# Patient Record
Sex: FEMALE | Race: WHITE | NOT HISPANIC OR LATINO | URBAN - METROPOLITAN AREA
[De-identification: names, ages, dates, MRNs, and addresses within clinical notes are randomized per-mention and may not be internally consistent; named-entity substitution may affect disease eponyms.]

---

## 2017-02-17 ENCOUNTER — COMMUNICATION - HEALTHEAST (OUTPATIENT)
Dept: FAMILY MEDICINE | Facility: CLINIC | Age: 52
End: 2017-02-17

## 2017-12-19 ENCOUNTER — COMMUNICATION - HEALTHEAST (OUTPATIENT)
Dept: FAMILY MEDICINE | Facility: CLINIC | Age: 52
End: 2017-12-19

## 2018-03-19 ENCOUNTER — COMMUNICATION - HEALTHEAST (OUTPATIENT)
Dept: FAMILY MEDICINE | Facility: CLINIC | Age: 53
End: 2018-03-19

## 2018-03-20 ENCOUNTER — COMMUNICATION - HEALTHEAST (OUTPATIENT)
Dept: FAMILY MEDICINE | Facility: CLINIC | Age: 53
End: 2018-03-20

## 2018-03-22 ENCOUNTER — COMMUNICATION - HEALTHEAST (OUTPATIENT)
Dept: FAMILY MEDICINE | Facility: CLINIC | Age: 53
End: 2018-03-22

## 2018-04-17 ENCOUNTER — OFFICE VISIT - HEALTHEAST (OUTPATIENT)
Dept: FAMILY MEDICINE | Facility: CLINIC | Age: 53
End: 2018-04-17

## 2018-04-17 DIAGNOSIS — R01.1 UNDIAGNOSED CARDIAC MURMURS: ICD-10-CM

## 2018-04-17 DIAGNOSIS — N60.19 FIBROCYSTIC BREAST DISEASE (FCBD), UNSPECIFIED LATERALITY: ICD-10-CM

## 2018-04-17 DIAGNOSIS — Z00.00 ROUTINE GENERAL MEDICAL EXAMINATION AT A HEALTH CARE FACILITY: ICD-10-CM

## 2018-04-17 DIAGNOSIS — E55.9 VITAMIN D DEFICIENCY: ICD-10-CM

## 2018-04-17 DIAGNOSIS — F41.9 ANXIETY: ICD-10-CM

## 2018-04-17 DIAGNOSIS — E78.00 HYPERCHOLESTEREMIA: ICD-10-CM

## 2018-04-17 ASSESSMENT — MIFFLIN-ST. JEOR: SCORE: 1446.37

## 2018-05-01 ENCOUNTER — COMMUNICATION - HEALTHEAST (OUTPATIENT)
Dept: ADMINISTRATIVE | Facility: CLINIC | Age: 53
End: 2018-05-01

## 2018-07-18 ENCOUNTER — COMMUNICATION - HEALTHEAST (OUTPATIENT)
Dept: FAMILY MEDICINE | Facility: CLINIC | Age: 53
End: 2018-07-18

## 2018-07-23 ENCOUNTER — COMMUNICATION - HEALTHEAST (OUTPATIENT)
Dept: FAMILY MEDICINE | Facility: CLINIC | Age: 53
End: 2018-07-23

## 2018-08-03 RX ORDER — DULOXETIN HYDROCHLORIDE 20 MG/1
20 CAPSULE, DELAYED RELEASE ORAL DAILY
Qty: 30 CAPSULE | Refills: 6 | Status: SHIPPED | OUTPATIENT
Start: 2018-08-03

## 2019-04-26 ENCOUNTER — COMMUNICATION - HEALTHEAST (OUTPATIENT)
Dept: FAMILY MEDICINE | Facility: CLINIC | Age: 54
End: 2019-04-26

## 2019-04-26 DIAGNOSIS — N95.1 PERI-MENOPAUSAL: ICD-10-CM

## 2019-05-03 ENCOUNTER — COMMUNICATION - HEALTHEAST (OUTPATIENT)
Dept: FAMILY MEDICINE | Facility: CLINIC | Age: 54
End: 2019-05-03

## 2019-08-03 ENCOUNTER — COMMUNICATION - HEALTHEAST (OUTPATIENT)
Dept: FAMILY MEDICINE | Facility: CLINIC | Age: 54
End: 2019-08-03

## 2019-08-03 DIAGNOSIS — N95.1 PERI-MENOPAUSAL: ICD-10-CM

## 2019-11-11 ENCOUNTER — COMMUNICATION - HEALTHEAST (OUTPATIENT)
Dept: FAMILY MEDICINE | Facility: CLINIC | Age: 54
End: 2019-11-11

## 2019-11-11 DIAGNOSIS — N95.1 PERI-MENOPAUSAL: ICD-10-CM

## 2019-11-12 ENCOUNTER — COMMUNICATION - HEALTHEAST (OUTPATIENT)
Dept: FAMILY MEDICINE | Facility: CLINIC | Age: 54
End: 2019-11-12

## 2020-01-22 ENCOUNTER — RECORDS - HEALTHEAST (OUTPATIENT)
Dept: ADMINISTRATIVE | Facility: OTHER | Age: 55
End: 2020-01-22

## 2020-02-04 ENCOUNTER — RECORDS - HEALTHEAST (OUTPATIENT)
Dept: ADMINISTRATIVE | Facility: OTHER | Age: 55
End: 2020-02-04

## 2020-02-20 ENCOUNTER — COMMUNICATION - HEALTHEAST (OUTPATIENT)
Dept: FAMILY MEDICINE | Facility: CLINIC | Age: 55
End: 2020-02-20

## 2020-02-20 DIAGNOSIS — N95.1 PERI-MENOPAUSAL: ICD-10-CM

## 2020-02-25 ENCOUNTER — COMMUNICATION - HEALTHEAST (OUTPATIENT)
Dept: FAMILY MEDICINE | Facility: CLINIC | Age: 55
End: 2020-02-25

## 2020-09-25 ENCOUNTER — RECORDS - HEALTHEAST (OUTPATIENT)
Dept: ADMINISTRATIVE | Facility: OTHER | Age: 55
End: 2020-09-25

## 2021-05-28 ENCOUNTER — RECORDS - HEALTHEAST (OUTPATIENT)
Dept: ADMINISTRATIVE | Facility: CLINIC | Age: 56
End: 2021-05-28

## 2021-05-28 NOTE — TELEPHONE ENCOUNTER
Dr Vick    Fax received from Lorena Gaxiolas , insurance plan does not cover   EFFEXOR XR 37.5 mg 24 hr capsule 90 capsule 0 4/28/2019     Sig: TAKE 1 CAPSULE(37.5 MG) BY MOUTH DAILY    Sent to pharmacy as: EFFEXOR XR 37.5 mg 24 hr capsule          Covered Alternatives: Venlafaxine ER 37.5mg capsule    Will you consider changing to a covered alternative?    Is there a medical reason that the patient would need brand name only?    If you prefer that the patient stays on this medciation and you would like to start the PA process, please send this encounter to the [facility] pool       Insurance Plan: [facility]  Patient ID: [id number]

## 2021-05-28 NOTE — TELEPHONE ENCOUNTER
RN cannot approve Refill Request    RN can NOT refill this medication PCP messaged that patient is overdue for Office Visit. Last office visit: 11/1/2016 Aparna Vick MD Last Physical: 4/17/2018 Last MTM visit: Visit date not found Last visit same specialty: 11/1/2016 Aparna Vick MD.  Next visit within 3 mo: Visit date not found  Next physical within 3 mo: Visit date not found      Bryan Goyal, [facility] Triage/Med Refill 4/28/2019    Requested Prescriptions   Pending Prescriptions Disp Refills     EFFEXOR XR 37.5 mg 24 hr capsule [Pharmacy Med Name: EFFEXOR XR 37.5MG CAPSULES] 90 capsule 0     Sig: TAKE 1 CAPSULE(37.5 MG) BY MOUTH DAILY       Venlafaxine/Desvenlafaxine Refill Protocol Failed - 4/26/2019  9:41 PM        Failed - LFT or AST or ALT in last year     Albumin   Date Value Ref Range Status   07/08/2014 3.9 3.5 - 5.0 g/dL Final     Bilirubin, Total   Date Value Ref Range Status   07/08/2014 0.6 0.0 - 1.0 mg/dL Final     Alkaline Phosphatase   Date Value Ref Range Status   07/08/2014 50 45 - 120 U/L Final     AST   Date Value Ref Range Status   07/08/2014 15 0 - 40 U/L Final     ALT   Date Value Ref Range Status   07/08/2014 18 0 - 45 U/L Final     Protein, Total   Date Value Ref Range Status   07/08/2014 6.8 6.0 - 8.0 g/dL Final                Failed - Fasting lipid cascade in last year     Cholesterol   Date Value Ref Range Status   10/24/2016 238 (H) <=199 mg/dL Final     Triglycerides   Date Value Ref Range Status   10/24/2016 143 <=149 mg/dL Final     HDL Cholesterol   Date Value Ref Range Status   10/24/2016 46 (L) >=50 mg/dL Final     LDL Calculated   Date Value Ref Range Status   10/24/2016 163 (H) <=129 mg/dL Final     Patient Fasting > 8hrs?   Date Value Ref Range Status   10/24/2016 Yes  Final             Failed - PCP or prescribing provider visit in last year     Last office visit with prescriber/PCP: 11/1/2016 Aparna Vick MD OR same dept: Visit date not  found OR same specialty: 11/1/2016 Aparna Vick MD  Last physical: 4/17/2018 Last MTM visit: Visit date not found   Next visit within 3 mo: Visit date not found  Next physical within 3 mo: Visit date not found  Prescriber OR PCP: Aparna Vick MD  Last diagnosis associated with med order: There are no diagnoses linked to this encounter.  If protocol passes may refill for 12 months if within 3 months of last provider visit (or a total of 15 months).             Failed - Blood Pressure in last year     BP Readings from Last 1 Encounters:   04/17/18 124/84

## 2021-05-28 NOTE — TELEPHONE ENCOUNTER
Central PA team  [phone]  Pool: [facility] PA MED ([id number])          PA has been initiated.       PA form completed and faxed insurance via Cover My Meds     Key: [id number]    Medication: Effexor XR 37.5MG er capsules    Insurance: [facility] [employer]        Response will be received via fax and may take up to 5-10 business days depending on plan

## 2021-05-28 NOTE — TELEPHONE ENCOUNTER
She tried duloxetine in 2018 instead but it was insufficient to help her anxiety. She lost sleep and resumed the venlafaxine.  Is there another med she must try to get venlafaxine covered?

## 2021-05-29 ENCOUNTER — RECORDS - HEALTHEAST (OUTPATIENT)
Dept: ADMINISTRATIVE | Facility: CLINIC | Age: 56
End: 2021-05-29

## 2021-05-30 ENCOUNTER — RECORDS - HEALTHEAST (OUTPATIENT)
Dept: ADMINISTRATIVE | Facility: CLINIC | Age: 56
End: 2021-05-30

## 2021-05-31 NOTE — TELEPHONE ENCOUNTER
RN cannot approve Refill Request    RN can NOT refill this medication PCP messaged that patient is overdue for Labs and Office Visit, and BP check. Last office visit: 11/1/2016 Aparna Vick MD Last Physical: 4/17/2018 Last MTM visit: Visit date not found Last visit same specialty: 11/1/2016 Aparna Vick MD.  Next visit within 3 mo: Visit date not found  Next physical within 3 mo: Visit date not found      Matheus Salazar, [facility] Triage/Med Refill 8/3/2019    Requested Prescriptions   Pending Prescriptions Disp Refills     EFFEXOR XR 37.5 mg 24 hr capsule [Pharmacy Med Name: EFFEXOR XR 37.5MG CAPSULES] 90 capsule 0     Sig: TAKE 1 CAPSULE BY MOUTH DAILY       Venlafaxine/Desvenlafaxine Refill Protocol Failed - 8/3/2019  9:56 AM        Failed - LFT or AST or ALT in last year     Albumin   Date Value Ref Range Status   07/08/2014 3.9 3.5 - 5.0 g/dL Final     Bilirubin, Total   Date Value Ref Range Status   07/08/2014 0.6 0.0 - 1.0 mg/dL Final     Alkaline Phosphatase   Date Value Ref Range Status   07/08/2014 50 45 - 120 U/L Final     AST   Date Value Ref Range Status   07/08/2014 15 0 - 40 U/L Final     ALT   Date Value Ref Range Status   07/08/2014 18 0 - 45 U/L Final     Protein, Total   Date Value Ref Range Status   07/08/2014 6.8 6.0 - 8.0 g/dL Final                Failed - Fasting lipid cascade in last year     Cholesterol   Date Value Ref Range Status   10/24/2016 238 (H) <=199 mg/dL Final     Triglycerides   Date Value Ref Range Status   10/24/2016 143 <=149 mg/dL Final     HDL Cholesterol   Date Value Ref Range Status   10/24/2016 46 (L) >=50 mg/dL Final     LDL Calculated   Date Value Ref Range Status   10/24/2016 163 (H) <=129 mg/dL Final     Patient Fasting > 8hrs?   Date Value Ref Range Status   10/24/2016 Yes  Final             Failed - PCP or prescribing provider visit in last year     Last office visit with prescriber/PCP: 11/1/2016 Aparna Vick MD OR same dept: Visit  date not found OR same specialty: 11/1/2016 Aparna Vick MD  Last physical: 4/17/2018 Last MTM visit: Visit date not found   Next visit within 3 mo: Visit date not found  Next physical within 3 mo: Visit date not found  Prescriber OR PCP: Aparna Vick MD  Last diagnosis associated with med order: 1. Geni-menopausal  - EFFEXOR XR 37.5 mg 24 hr capsule [Pharmacy Med Name: EFFEXOR XR 37.5MG CAPSULES]; TAKE 1 CAPSULE BY MOUTH DAILY  Dispense: 90 capsule; Refill: 0    If protocol passes may refill for 12 months if within 3 months of last provider visit (or a total of 15 months).             Failed - Blood Pressure in last year     BP Readings from Last 1 Encounters:   04/17/18 124/84

## 2021-06-01 VITALS — BODY MASS INDEX: 28.33 KG/M2 | WEIGHT: 180.5 LBS | HEIGHT: 67 IN

## 2021-06-02 ENCOUNTER — RECORDS - HEALTHEAST (OUTPATIENT)
Dept: ADMINISTRATIVE | Facility: CLINIC | Age: 56
End: 2021-06-02

## 2021-06-03 NOTE — TELEPHONE ENCOUNTER
RN cannot approve Refill Request    RN can NOT refill this medication PCP messaged that patient is overdue for Labs and Office Visit. Last office visit: 11/1/2016 Aparna Vick MD Last Physical: 4/17/2018 Last MTM visit: Visit date not found Last visit same specialty: 11/1/2016 Aparna Vick MD.  Next visit within 3 mo: Visit date not found  Next physical within 3 mo: Visit date not found      Kate Patino, [facility] Triage/Med Refill 11/12/2019    Requested Prescriptions   Pending Prescriptions Disp Refills     EFFEXOR XR 37.5 mg 24 hr capsule [Pharmacy Med Name: EFFEXOR XR 37.5MG CAPSULES] 90 capsule 0     Sig: TAKE 1 CAPSULE BY MOUTH EVERY DAY       Venlafaxine/Desvenlafaxine Refill Protocol Failed - 11/11/2019  3:01 PM        Failed - LFT or AST or ALT in last year     Albumin   Date Value Ref Range Status   07/08/2014 3.9 3.5 - 5.0 g/dL Final     Bilirubin, Total   Date Value Ref Range Status   07/08/2014 0.6 0.0 - 1.0 mg/dL Final     Alkaline Phosphatase   Date Value Ref Range Status   07/08/2014 50 45 - 120 U/L Final     AST   Date Value Ref Range Status   07/08/2014 15 0 - 40 U/L Final     ALT   Date Value Ref Range Status   07/08/2014 18 0 - 45 U/L Final     Protein, Total   Date Value Ref Range Status   07/08/2014 6.8 6.0 - 8.0 g/dL Final                Failed - Fasting lipid cascade in last year     Cholesterol   Date Value Ref Range Status   10/24/2016 238 (H) <=199 mg/dL Final     Triglycerides   Date Value Ref Range Status   10/24/2016 143 <=149 mg/dL Final     HDL Cholesterol   Date Value Ref Range Status   10/24/2016 46 (L) >=50 mg/dL Final     LDL Calculated   Date Value Ref Range Status   10/24/2016 163 (H) <=129 mg/dL Final     Patient Fasting > 8hrs?   Date Value Ref Range Status   10/24/2016 Yes  Final             Failed - PCP or prescribing provider visit in last year     Last office visit with prescriber/PCP: 11/1/2016 Aparna Vick MD OR same dept: Visit date  not found OR same specialty: 11/1/2016 Aparna Vick MD  Last physical: 4/17/2018 Last MTM visit: Visit date not found   Next visit within 3 mo: Visit date not found  Next physical within 3 mo: Visit date not found  Prescriber OR PCP: Aparna Vick MD  Last diagnosis associated with med order: 1. Geni-menopausal  - EFFEXOR XR 37.5 mg 24 hr capsule [Pharmacy Med Name: EFFEXOR XR 37.5MG CAPSULES]; TAKE 1 CAPSULE BY MOUTH EVERY DAY  Dispense: 90 capsule; Refill: 0    If protocol passes may refill for 12 months if within 3 months of last provider visit (or a total of 15 months).             Failed - Blood Pressure in last year     BP Readings from Last 1 Encounters:   04/17/18 124/84

## 2021-06-06 NOTE — TELEPHONE ENCOUNTER
RN cannot approve Refill Request    RN can NOT refill this medication Protocol failed and NO refill given. Last office visit: 11/1/2016 Aparna Vick MD Last Physical: 4/17/2018 Last MTM visit: Visit date not found Last visit same specialty: 11/1/2016 Aparna Vick MD.  Next visit within 3 mo: Visit date not found  Next physical within 3 mo: Visit date not found      Kerline Sheppard, [facility] Triage/Med Refill 2/23/2020    Requested Prescriptions   Pending Prescriptions Disp Refills     EFFEXOR XR 37.5 mg 24 hr capsule [Pharmacy Med Name: EFFEXOR XR 37.5MG CAPSULES] 90 capsule 0     Sig: TAKE 1 CAPSULE BY MOUTH EVERY DAY       Venlafaxine/Desvenlafaxine Refill Protocol Failed - 2/20/2020  8:28 AM        Failed - LFT or AST or ALT in last year     Albumin   Date Value Ref Range Status   07/08/2014 3.9 3.5 - 5.0 g/dL Final     Bilirubin, Total   Date Value Ref Range Status   07/08/2014 0.6 0.0 - 1.0 mg/dL Final     Alkaline Phosphatase   Date Value Ref Range Status   07/08/2014 50 45 - 120 U/L Final     AST   Date Value Ref Range Status   07/08/2014 15 0 - 40 U/L Final     ALT   Date Value Ref Range Status   07/08/2014 18 0 - 45 U/L Final     Protein, Total   Date Value Ref Range Status   07/08/2014 6.8 6.0 - 8.0 g/dL Final                Failed - Fasting lipid cascade in last year     Cholesterol   Date Value Ref Range Status   10/24/2016 238 (H) <=199 mg/dL Final     Triglycerides   Date Value Ref Range Status   10/24/2016 143 <=149 mg/dL Final     HDL Cholesterol   Date Value Ref Range Status   10/24/2016 46 (L) >=50 mg/dL Final     LDL Calculated   Date Value Ref Range Status   10/24/2016 163 (H) <=129 mg/dL Final     Patient Fasting > 8hrs?   Date Value Ref Range Status   10/24/2016 Yes  Final             Failed - PCP or prescribing provider visit in last year     Last office visit with prescriber/PCP: 11/1/2016 Aparna Vick MD OR same dept: Visit date not found OR same specialty:  11/1/2016 Aparna Vick MD  Last physical: 4/17/2018 Last MTM visit: Visit date not found   Next visit within 3 mo: Visit date not found  Next physical within 3 mo: Visit date not found  Prescriber OR PCP: Aparna Vick MD  Last diagnosis associated with med order: 1. Geni-menopausal  - EFFEXOR XR 37.5 mg 24 hr capsule [Pharmacy Med Name: EFFEXOR XR 37.5MG CAPSULES]; TAKE 1 CAPSULE BY MOUTH EVERY DAY  Dispense: 90 capsule; Refill: 0    If protocol passes may refill for 12 months if within 3 months of last provider visit (or a total of 15 months).             Failed - Blood Pressure in last year     BP Readings from Last 1 Encounters:   04/17/18 124/84

## 2021-06-16 PROBLEM — K21.00 GASTROESOPHAGEAL REFLUX DISEASE WITH ESOPHAGITIS: Status: ACTIVE | Noted: 2020-10-08

## 2021-06-16 NOTE — TELEPHONE ENCOUNTER
Telephone Encounter by Alexandra Lowery at 5/8/2019  4:03 PM     Author: Alexandra Lowery Service: -- Author Type: --    Filed: 5/8/2019  4:04 PM Encounter Date: 5/3/2019 Status: Signed    : Alexandra Lowery APPROVED:    Approval start date:5/8/2019  Approval end date:5/8/2020    Pharmacy has been notified of approval and will contact patient when medication is ready for pickup.

## 2021-06-17 NOTE — PROGRESS NOTES
FEMALE PREVENTATIVE EXAM    Assessment and Plan:       1. Routine general medical examination at a health care facility  Doing well; she'll check into her coverage to get the Shingles shot; we'll provide an order or do a nurse-only visit for her to get the shot - however it's cheapest per her insurance. Given her strong familiy history of getting shingles, I strongly recommend she get this shot.    2. Anxiety  Continue Effexor    3. Fibrocystic breast disease (FCBD), unspecified laterality  Mammogram due; she'll do it at [facility] and will call from home to set up the appointment.    4. Hypercholesteremia  Recommend checking lipids this year or next    5. Murmurs  same    6. Vitamin D Deficiency (mild)  Continue supplement        Next follow up:  No Follow-up on file.    Immunization Review  Adult Imm Review: No immunizations due today  Documented tobacco use.  Website and phone contact for QuitPlan given to patient in AVS.    I discussed the following with the patient:   Adult Healthy Living: Importance of regular exercise  Healthy nutrition  Getting adequate sleep  Stress management    I have had an Advance Directives discussion with the patient. she has a document she is happy with.            Subjective:   Chief Complaint: Kate Ulrich is an 52 y.o. female here for a preventative health visit.     HPI:  W/o Effexor - cannot sleep due to anxiety - then goes back on it; each time she gets a supply, it requires 3 trips to the pharmacy because she's on the drug-price reduction plan. No new difficult events in the past year.    Healthy Habits  Are you taking a daily aspirin? No  Do you typically exercising at least 40 min, 3-4 times per week?  NO  Do you usually eat at least 4 servings of fruit and vegetables a day, include whole grains and fiber and avoid regularly eating high fat foods? NO  Have you had an eye exam in the past two years? Yes  Do you see a dentist twice per year? Yes  Do you have any concerns  "regarding sleep? No    Safety Screen  If you own firearms, are they secured in a locked gun cabinet or with trigger locks? Yes  Do you feel you are safe where you are living?: Yes (4/17/2018  8:22 AM)    Review of Systems:  Please see above.  The rest of the review of systems are negative for all systems.     Pap History:   Yes - updated in Problem List and Health Maintenance accordingly  Cancer Screening       Status Date      MAMMOGRAM Overdue 9/7/2017      Done 9/7/2016 MAMMO SCREENING BILATERAL     Patient has more history with this topic...    PAP SMEAR Next Due 7/31/2019      Done 7/31/2014 GYNECOLOGIC CYTOLOGY (PAP SMEAR)     Patient has more history with this topic...    COLONOSCOPY Next Due 11/1/2026      Declined 11/1/2016           Patient Care Team:  Aparna Miles MD as PCP - General (Family Medicine)        History     Reviewed By Date/Time Sections Reviewed    Aparna Vick MD 4/17/2018  9:13 AM Medical, Surgical, Tobacco, Alcohol, Drug Use, Sexual Activity, Family, Obstetric, Socioeconomic    Kristine Greenwood MA 4/17/2018  8:23 AM Tobacco            Objective:   Vital Signs:   Visit Vitals     /84 (Patient Site: Left Arm, Patient Position: Sitting, Cuff Size: Adult Regular)     Pulse 81     Temp 98  F (36.7  C) (Oral)     Ht 5' 7\" (1.702 m)     Wt 180 lb 8 oz (81.9 kg)     LMP 04/04/2018 (Approximate)     SpO2 98%  Comment: RA     Breastfeeding No     BMI 28.27 kg/m2          PHYSICAL EXAM  Physical Exam:  General Appearance: Alert, cooperative, no distress, appears stated age  Head: Normocephalic, without obvious abnormality, atraumatic  Eyes: PERRL, conjunctiva/corneas clear, EOM's intact  Ears: Normal TM's and external ear canals, both ears  Nose: Nares normal, septum midline,mucosa normal, no drainage  Throat: Lips, mucosa, and tongue normal; teeth and gums normal  Neck: Supple, symmetrical, trachea midline, no adenopathy;  thyroid: not enlarged, symmetric, no " "tenderness/mass/nodules  Back: Symmetric, no curvature, ROM normal, no CVA tenderness  Lungs: Clear to auscultation bilaterally, respirations unlabored  Breasts: declined - \"I have lumpy breasts\"  Heart: Regular rate and rhythm, S1 and S2 normal, soft mid-systolic murmur  Abdomen: Soft, non-tender, bowel sounds active,  no masses, no organomegaly  Pelvic:not due  Extremities: Extremities normal, atraumatic, no cyanosis or edema  Skin: Skin color, texture, turgor normal, no rashes or lesions  Neurologic: Normal       The 10-year ASCVD risk score (Miltonsydnee BLAIR Jr, et al., 2013) is: 2.1%    Values used to calculate the score:      Age: 52 years      Sex: Female      Is Non- : No      Diabetic: No      Tobacco smoker: No      Systolic Blood Pressure: 124 mmHg      Is BP treated: No      HDL Cholesterol: 46 mg/dL      Total Cholesterol: 238 mg/dL         Medication List          These changes are accurate as of 4/17/18  9:14 AM.  If you have any questions, ask your nurse or doctor.               CONTINUE taking these medications          cholecalciferol (vitamin D3) 1,000 unit tablet   INSTRUCTIONS:  Take 1,000 Units by mouth daily.           * venlafaxine 37.5 MG 24 hr capsule   Also known as:  EFFEXOR XR   INSTRUCTIONS:  Take 1 capsule (37.5 mg total) by mouth daily.           * EFFEXOR XR 37.5 MG 24 hr capsule   INSTRUCTIONS:  Take 1 capsule (37.5 mg total) by mouth daily.   Generic drug:  venlafaxine           * Notice:  This list has 2 medication(s) that are the same as other medications prescribed for you. Read the directions carefully, and ask your doctor or other care provider to review them with you.         Where to Get Your Medications      These medications were sent to [facility] [id number] [place] [address] AT [facility]  [address] [place] [id number]     Phone:  [phone]      EFFEXOR XR 37.5 MG 24 hr capsule             Additional Screenings " Completed Today:

## 2021-07-13 ENCOUNTER — RECORDS - HEALTHEAST (OUTPATIENT)
Dept: ADMINISTRATIVE | Facility: CLINIC | Age: 56
End: 2021-07-13

## 2021-07-21 ENCOUNTER — RECORDS - HEALTHEAST (OUTPATIENT)
Dept: ADMINISTRATIVE | Facility: CLINIC | Age: 56
End: 2021-07-21

## 2021-08-07 ENCOUNTER — HEALTH MAINTENANCE LETTER (OUTPATIENT)
Age: 56
End: 2021-08-07

## 2021-10-02 ENCOUNTER — HEALTH MAINTENANCE LETTER (OUTPATIENT)
Age: 56
End: 2021-10-02

## 2022-08-28 ENCOUNTER — HEALTH MAINTENANCE LETTER (OUTPATIENT)
Age: 57
End: 2022-08-28

## 2023-01-14 ENCOUNTER — HEALTH MAINTENANCE LETTER (OUTPATIENT)
Age: 58
End: 2023-01-14

## 2023-09-30 ENCOUNTER — HEALTH MAINTENANCE LETTER (OUTPATIENT)
Age: 58
End: 2023-09-30